# Patient Record
(demographics unavailable — no encounter records)

---

## 2024-11-01 NOTE — HISTORY OF PRESENT ILLNESS
[de-identified] : 4 year old female presents for initial evaluation of failed hearing test in left ear Passed [facility] PCP prescribed ear softening drops for impacted cerumen  Complains of left otalgia and itchiness No otorrhea or fevers No concerns for speech delay No trouble swallowing or breathing Born full term via uncomplicated delivery, uncomplicated pregnancy

## 2025-02-21 NOTE — REASON FOR VISIT
[Subsequent Evaluation] : a subsequent evaluation for [Mother] : mother [Other: ______] : provided by [facility] [Interpreters_IDNumber] : [id number] [Interpreters_FullName] : Julio [TWNoteComboBox1] : [place] citizen

## 2025-02-21 NOTE — DATA REVIEWED
[FreeTextEntry1] :  I ordered, reviewed and interpreted today's audiometric testing myself and discussed the results with the family.   Suspected Diagnosis: bilateral conductive hearing loss:   My interpretation is in keeping with:   Right ear: type A Left ear:  type B tympanogram (likely due to Eustachian tube dysfunction or effusion)   right normal, left mild CHL

## 2025-02-21 NOTE — PHYSICAL EXAM
[Clear to Auscultation] : lungs were clear to auscultation bilaterally [Normal Gait and Station] : normal gait and station [Normal muscle strength, symmetry and tone of facial, head and neck musculature] : normal muscle strength, symmetry and tone of facial, head and neck musculature [Normal] : no cervical lymphadenopathy [Effusion] : effusion [Exposed Vessel] : left anterior vessel not exposed [2+] : 2+ [Wheezing] : no wheezing [Increased Work of Breathing] : no increased work of breathing with use of accessory muscles and retractions [FreeTextEntry9] : as per HPI

## 2025-02-21 NOTE — HISTORY OF PRESENT ILLNESS
[Snoring] : snoring [Gasping] : gasping [Ear Pain] : no ear pain [de-identified] : 4yr F here for follow up for left ear itching and evaluation of adenoids/tonsils  [place]. left ear itching since 11/24, no associated pain  No recent ear or throat infections  +snoring, pausing/gasping, mouth breathing No daytime sleepiness or bedwetting  +congestion, intermit. nasal steroid use  No concerns for hearing or speech

## 2025-04-22 NOTE — HISTORY OF PRESENT ILLNESS
[de-identified] : 5 year old girl here for follow up for left ear itching and snoring. No longer having left ear itchiness. Using Flonase every night with good relief. No recent ear or throat infections. No longer snoring at night. No pauses or gasps. No daytime sleepiness or bedwetting No concerns for hearing or speech.   History or symptoms during sleep: snoring, gasping.   History or Symptoms: no ear pain.

## 2025-04-22 NOTE — REASON FOR VISIT
[Subsequent Evaluation] : a subsequent evaluation for [Mother] : mother [Interpreters_IDNumber] : [id number] [Interpreters_FullName] : Idalia [TWNoteComboBox1] : [place]

## 2025-04-22 NOTE — DATA REVIEWED
[FreeTextEntry1] : history obtained from primary caregiver as independent historian due to patient's developmental age and limited recall   I reviewed her prior audiometric testing and ENT notes which are in keeping with bilateral conductive hearing loss due to middle ear effusions    I ordered, reviewed and interpreted today's audiometric testing myself and discussed the results with the family.  Suspected diagnosis: Bilateral conductive hearing loss  My interpretation is in keeping with:   Right ear:  type A tympanogram Left ear:  type A tympanogram   Normal hearing bilaterally / in the soundfield